# Patient Record
Sex: FEMALE | Race: WHITE | NOT HISPANIC OR LATINO | URBAN - METROPOLITAN AREA
[De-identification: names, ages, dates, MRNs, and addresses within clinical notes are randomized per-mention and may not be internally consistent; named-entity substitution may affect disease eponyms.]

---

## 2017-01-17 ENCOUNTER — OFFICE VISIT - RIVER FALLS (OUTPATIENT)
Dept: FAMILY MEDICINE | Facility: CLINIC | Age: 72
End: 2017-01-17

## 2017-10-27 ENCOUNTER — OFFICE VISIT - RIVER FALLS (OUTPATIENT)
Dept: FAMILY MEDICINE | Facility: CLINIC | Age: 72
End: 2017-10-27

## 2017-10-28 LAB — HBA1C MFR BLD: 5.1 %

## 2017-10-30 ENCOUNTER — OFFICE VISIT - RIVER FALLS (OUTPATIENT)
Dept: FAMILY MEDICINE | Facility: CLINIC | Age: 72
End: 2017-10-30

## 2017-10-30 ASSESSMENT — MIFFLIN-ST. JEOR: SCORE: 1117.77

## 2017-11-04 LAB
CREAT SERPL-MCNC: 0.75 MG/DL (ref 0.6–0.93)
GLUCOSE BLD-MCNC: 110 MG/DL (ref 65–99)

## 2017-11-06 ENCOUNTER — OFFICE VISIT - RIVER FALLS (OUTPATIENT)
Dept: FAMILY MEDICINE | Facility: CLINIC | Age: 72
End: 2017-11-06

## 2017-11-07 LAB
CREAT SERPL-MCNC: 0.89 MG/DL (ref 0.6–0.93)
GLUCOSE BLD-MCNC: 110 MG/DL (ref 65–99)

## 2018-04-04 ENCOUNTER — AMBULATORY - RIVER FALLS (OUTPATIENT)
Dept: FAMILY MEDICINE | Facility: CLINIC | Age: 73
End: 2018-04-04

## 2018-04-07 ENCOUNTER — AMBULATORY - RIVER FALLS (OUTPATIENT)
Dept: FAMILY MEDICINE | Facility: CLINIC | Age: 73
End: 2018-04-07

## 2018-04-07 ENCOUNTER — COMMUNICATION - RIVER FALLS (OUTPATIENT)
Dept: FAMILY MEDICINE | Facility: CLINIC | Age: 73
End: 2018-04-07

## 2018-05-30 ENCOUNTER — OFFICE VISIT - RIVER FALLS (OUTPATIENT)
Dept: FAMILY MEDICINE | Facility: CLINIC | Age: 73
End: 2018-05-30

## 2018-06-07 ENCOUNTER — OFFICE VISIT - RIVER FALLS (OUTPATIENT)
Dept: FAMILY MEDICINE | Facility: CLINIC | Age: 73
End: 2018-06-07

## 2018-06-21 ENCOUNTER — OFFICE VISIT - RIVER FALLS (OUTPATIENT)
Dept: FAMILY MEDICINE | Facility: CLINIC | Age: 73
End: 2018-06-21

## 2019-11-04 ENCOUNTER — AMBULATORY - RIVER FALLS (OUTPATIENT)
Dept: FAMILY MEDICINE | Facility: CLINIC | Age: 74
End: 2019-11-04

## 2019-11-04 ENCOUNTER — OFFICE VISIT - RIVER FALLS (OUTPATIENT)
Dept: FAMILY MEDICINE | Facility: CLINIC | Age: 74
End: 2019-11-04

## 2019-11-05 ENCOUNTER — COMMUNICATION - RIVER FALLS (OUTPATIENT)
Dept: FAMILY MEDICINE | Facility: CLINIC | Age: 74
End: 2019-11-05

## 2019-11-05 LAB — TSH SERPL DL<=0.005 MIU/L-ACNC: 0.19 MIU/L (ref 0.4–4.5)

## 2019-11-09 ENCOUNTER — COMMUNICATION - RIVER FALLS (OUTPATIENT)
Dept: FAMILY MEDICINE | Facility: CLINIC | Age: 74
End: 2019-11-09

## 2020-02-24 ENCOUNTER — OFFICE VISIT - RIVER FALLS (OUTPATIENT)
Dept: FAMILY MEDICINE | Facility: CLINIC | Age: 75
End: 2020-02-24

## 2020-02-24 ENCOUNTER — AMBULATORY - RIVER FALLS (OUTPATIENT)
Dept: FAMILY MEDICINE | Facility: CLINIC | Age: 75
End: 2020-02-24

## 2020-02-24 ENCOUNTER — COMMUNICATION - RIVER FALLS (OUTPATIENT)
Dept: FAMILY MEDICINE | Facility: CLINIC | Age: 75
End: 2020-02-24

## 2020-03-05 ENCOUNTER — AMBULATORY - RIVER FALLS (OUTPATIENT)
Dept: FAMILY MEDICINE | Facility: CLINIC | Age: 75
End: 2020-03-05

## 2020-03-06 ENCOUNTER — OFFICE VISIT - RIVER FALLS (OUTPATIENT)
Dept: FAMILY MEDICINE | Facility: CLINIC | Age: 75
End: 2020-03-06

## 2020-03-06 ENCOUNTER — COMMUNICATION - RIVER FALLS (OUTPATIENT)
Dept: FAMILY MEDICINE | Facility: CLINIC | Age: 75
End: 2020-03-06

## 2021-05-12 ENCOUNTER — COMMUNICATION - RIVER FALLS (OUTPATIENT)
Dept: FAMILY MEDICINE | Facility: CLINIC | Age: 76
End: 2021-05-12

## 2022-02-11 VITALS — WEIGHT: 134 LBS | HEART RATE: 84 BPM | SYSTOLIC BLOOD PRESSURE: 108 MMHG | DIASTOLIC BLOOD PRESSURE: 72 MMHG

## 2022-02-12 VITALS
OXYGEN SATURATION: 96 % | DIASTOLIC BLOOD PRESSURE: 66 MMHG | SYSTOLIC BLOOD PRESSURE: 114 MMHG | WEIGHT: 128.4 LBS | BODY MASS INDEX: 21.26 KG/M2 | TEMPERATURE: 97.2 F | HEART RATE: 100 BPM

## 2022-02-12 VITALS
WEIGHT: 138 LBS | OXYGEN SATURATION: 97 % | DIASTOLIC BLOOD PRESSURE: 80 MMHG | RESPIRATION RATE: 16 BRPM | WEIGHT: 136 LBS | HEART RATE: 88 BPM | TEMPERATURE: 98.2 F | BODY MASS INDEX: 22.66 KG/M2 | BODY MASS INDEX: 21.03 KG/M2 | BODY MASS INDEX: 22.96 KG/M2 | HEART RATE: 84 BPM | DIASTOLIC BLOOD PRESSURE: 84 MMHG | WEIGHT: 127 LBS | TEMPERATURE: 97 F | TEMPERATURE: 96.3 F | SYSTOLIC BLOOD PRESSURE: 124 MMHG | HEART RATE: 64 BPM | SYSTOLIC BLOOD PRESSURE: 110 MMHG | DIASTOLIC BLOOD PRESSURE: 80 MMHG | SYSTOLIC BLOOD PRESSURE: 118 MMHG | HEIGHT: 65 IN

## 2022-02-12 VITALS
BODY MASS INDEX: 22.19 KG/M2 | TEMPERATURE: 96.8 F | SYSTOLIC BLOOD PRESSURE: 110 MMHG | WEIGHT: 134 LBS | DIASTOLIC BLOOD PRESSURE: 74 MMHG | HEART RATE: 80 BPM

## 2022-02-15 NOTE — TELEPHONE ENCOUNTER
Entered by Ana Hillman CMA on May 12, 2021 6:01:31 AM CDT  ---------------------  From: Ana Hillman CMA   To: [facility]    Sent: 5/12/2021 6:01:31 AM CDT  Subject: Medication Management     ** Submitted: **  Order:levothyroxine (levothyroxine 100 mcg (0.1 mg) oral tablet)  1 tab(s)  Oral  daily  Qty:  30 tab(s)        Refills:  0          Substitutions Allowed     Route To Pharmacy [facility]    Signed by Ana Hillman CMA  5/12/2021 11:01:00 AM [facility]    ** Submitted: **  Complete:levothyroxine (levothyroxine 100 mcg (0.1 mg) oral tablet)   Signed by Ana Hillman CMA  5/12/2021 11:01:00 AM [facility]    ** Not Approved:  **  levothyroxine (LEVOTHYROXINE SODIUM 100MCG TABS)  TAKE ONE TABLET BY MOUTH EVERY MORNING  Qty:  90 unknown unit        Days Supply:  90        Refills:  1          Substitutions Allowed     Route To [facility]   Signed by Ana Hillman CMA            ** Patient matched by Ana Hillman CMA on 5/12/2021 5:56:59 AM CDT **      ------------------------------------------  From: [facility]  To: Esthela Camara  Sent: May 11, 2021 12:45:57 PM CDT  Subject: Medication Management  Due: April 27, 2021 12:41:43 AM CDT     ** On Hold Pending Signature **     Drug: levothyroxine (levothyroxine 100 mcg (0.1 mg) oral tablet), TAKE ONE TABLET BY MOUTH EVERY MORNING  Quantity: 90 unknown unit  Days Supply: 90  Refills: 0  Substitutions Allowed  Notes from Pharmacy:     Dispensed Drug: levothyroxine (levothyroxine 100 mcg (0.1 mg) oral tablet), TAKE ONE TABLET BY MOUTH EVERY MORNING  Quantity: 90 unknown unit  Days Supply: 90  Refills: 1  Substitutions Allowed  Notes from Pharmacy:  ------------------------------------------3/6/20 thyroid  rtc (placed today) due now

## 2022-02-15 NOTE — CARE COORDINATION
Sources of Information:  [ ] Patient, family member, or caregiver (Please list):  [x] Hospital discharge summary  [ ] Hospital fax  [ ] List of recent hospitalizations or ED visits  [ ] Other:     Discharged From: [facility]  Discharge Date: 10/28/17    Diagnosis/Problem: alcoholic ketoacidosis, alcohol use, acquired hypothyroidsm    Medication Changes: [ ] Yes [x ] No   Medication List Updated: [x] Yes [] No    Needs Referral or Lab: [x ] Yes [ ] No       Needs Follow-up Appointment:  [x ] Within 7 days of discharge (highly complex visit)  [ ] Within 14 days of discharge (moderately complex visit)    Appointment Made With: NORBERTO  Date: 10/30/17    Pt presented for appt before I was able to call pt. Labs ordered.

## 2022-02-15 NOTE — TELEPHONE ENCOUNTER
---------------------  From: Roman Delgado   Sent: 9/16/2019 3:16:40 PM CDT  Subject: General Message     Med Refill      Date of last office visit and reason:  6/7/2018; Anxiety/Hypothyroidism/alcohol abuse                                  Date of last Med Check / Px:   06/7/2018  Date of last labs pertaining to med:  04/7/2018    RTC order in chart:  Yes, DUE NOW!    For Protocol refill, has patient been contacted:  yes      Received fax from [employer]. Sent 30 day supply and added a note stating that she needs an appt for further refills.

## 2022-02-15 NOTE — PROGRESS NOTES
called pt, although no symptoms I am going to treat for 3 d with cipro givne her recent hospitalization and poor nutrition

## 2022-02-15 NOTE — LETTER
(Inserted Image. Unable to display)   November 05, 2019      ERIS CUEVAS      [address], [place] [id number]        Dear ERIS,    Thank you for selecting [facility] for your healthcare needs.  Below you will find the results of the recent tests done at our clinic.      Your dose needs to be adjusted, Eris. I look forward to seeing you for an upcoming appointment.      Result Name Current Result Previous Result Reference Range   TSH (mIU/L) ((L)) 0.19 11/4/2019  0.65 4/7/2018 0.40 - 4.50       Please contact me or my assistant at [phone] if you have any questions or concerns.     Sincerely,        YI Reinoso-NP  Family Nurse Practitioner      What do your labs mean?  Below is a glossary of commonly ordered labs:  LDL   Bad Cholesterol   HDL   Good Cholesterol  AST/ALT   Liver Function   Cr/Creatinine   Kidney Function  Microalbumin   Kidney Function  BUN   Kidney Function  PSA   Prostate    TSH   Thyroid Hormone  HgbA1c   Diabetes Test   Hgb (Hemoglobin)   Red Blood Cells  WBC   White Blood Cell Count

## 2022-02-15 NOTE — PROGRESS NOTES
"   Patient:   ERIS CUEVAS            MRN: [id number]            FIN: [id number]               Age:   73 years     Sex:  Female     :  1945   Associated Diagnoses:   Alcohol abuse; Anxiety; Hypothyroid   Author:   Alicia Ring      Chief Complaint   2018 8:26 AM CDT     Thyroid f/u.      History of Present Illness   PPC originally for an wellness exam but unfortunately she arrived almost 30 minutes late and in the the exam room she tells me she really only wants her thyroid medication refilled.  mammogram: last one   thyroid check 2018: normal  alcohol abuse: on going concern, pt very teary eyed, \"raise my  from the dead\"  and maybe I would be better.  She has tried detox in past but refuses assistance at this time  she considers herself a \"social drinker\", she admits it gives her comfort and admits she drinks d/t resentment from aging and anxiety related to these changes.  Please see 2018 note from Dr Westbrook when Eris was seen after falling  she has tried counseling in past (\"in my thirties\") and tells me she is not \"a fan\"      Review of Systems   Constitutional:  Negative.    Ear/Nose/Mouth/Throat:  Negative.    Respiratory:  Negative.    Cardiovascular:  Negative.    Gastrointestinal:  Negative, fy hx of colon cancer but pt refuses to repeat colonoscopy.    Hematology/Lymphatics:  Negative.    Endocrine:  Negative except as documented in history of present illness.    Immunologic:  Negative.    Musculoskeletal:  Negative.    Integumentary:  Negative.    Neurologic:  Negative except as documented in history of present illness.    Psychiatric:  Negative except as documented in history of present illness, No zac, Not suicidal, Not delusional, No hallucinations.       Health Status   Allergies:    Allergic Reactions (Selected)  No known allergies   Medications:  (Selected)   Prescriptions  Prescribed  betamethasone dipropionate 0.05% topical cream: 1 sha, top, bid, " Instructions: to affected area, # 50 gm, 1 Refill(s), Type: Maintenance, Pharmacy: [facility], to replace fluocinonide cream due to cost, 1 sha top bid,x7 day(s),Instr:to affected area  levothyroxine 112 mcg (0.112 mg) oral tablet: 1 tab(s) ( 112 mcg ), po, daily, # 90 tab(s), 3 Refill(s), Type: Maintenance, Pharmacy: [facility], 1 tab(s) po daily  Documented Medications  Documented  aspirin 81 mg oral tablet: 1 tab(s) ( 81 mg ), po, daily, 0 Refill(s), Type: Maintenance   Problem list:    All Problems (Selected)  Disorder of bone and cartilage / SNOMED CT 607534218 / Confirmed  Dyslipidemia / SNOMED CT 2183637782 / Confirmed  Fatty liver, alcoholic / SNOMED CT 05628257 / Confirmed  Hypothyroid / SNOMED CT 87181443 / Confirmed  Prediabetes / SNOMED CT 9768672384 / Confirmed      Histories   Family History:    Cancer  Grandfather (M)  Diabetes mellitus type 2  Aunt (M)  Aunt (P)  CA - Cancer of colon  Sister  Colon cancer.  Father  Brother  Degeneration macular.  Mother        Physical Examination   Vital Signs   6/7/2018 8:26 AM CDT Peripheral Pulse Rate 84 bpm    Pulse Site Radial artery    HR Method Manual    Systolic Blood Pressure 108 mmHg    Diastolic Blood Pressure 72 mmHg    Mean Arterial Pressure 84 mmHg    BP Site Right arm    BP Method Manual      Measurements from flowsheet : Measurements   6/7/2018 8:26 AM CDT     Weight Measured - Standard                134 lb     General:  Alert and oriented, No acute distress, crying in room.    Eye:  Pupils are equal, round and reactive to light.    HENT:  Normocephalic.    Neck:  Supple, Non-tender, No thyromegaly.    Respiratory:  Lungs are clear to auscultation, Respirations are non-labored.    Cardiovascular:  Normal rate, Regular rhythm, No edema.    Musculoskeletal:  Normal range of motion, Normal gait.    Integumentary:  Warm, Dry, Pink.    Neurologic:  Alert, Oriented, Normal sensory, No focal deficits.    Psychiatric:  Cooperative, Appropriate mood &  "affect, Normal judgment.       Impression and Plan   Diagnosis     Alcohol abuse (NYL09-EW F10.10).     Anxiety (MWF84-YO F41.9).     Hypothyroid (XOM91-ET E03.9).     Summary:  \"i am so fucking independent I don't want to rely on anything like that.\" (counseling)  \"Im resentful because of aging issues\"  \" i try to numb myself to the loss\"  I spent over 25 minutes with pt today with over 50% in education and counseling, she has agreed to talk to a counselor about her anxiety and depression, I have discussed detox but she refuses to entertain this thought, This is an open door offer when and if she is willing to consider this    her TSH was normal 4/2018 so I have refilled her medication    mammogram will be ordered as she is willing to do this    refuses colonoscopy but will do cologuard.    would like to see her back in 1 month   .    Orders     Orders (Selected)   Prescriptions  Prescribed  levothyroxine 112 mcg (0.112 mg) oral tablet: 1 tab(s) ( 112 mcg ), po, daily, # 90 tab(s), 3 Refill(s), Type: Maintenance, Pharmacy: [facility], 1 tab(s) po daily.  "

## 2022-02-15 NOTE — LETTER
(Inserted Image. Unable to display)   May 14, 2021    ANNALISE CUEVAS  [address] [place] [address]            Dear ANNALISE,      Thank you for selecting [facility] for your healthcare needs.    Our records indicate you are due for the following services:    Follow-up office visit / Medication Check.    Non-Fasting Labs.    If you had your labs done at another facility or with Direct Access Lab Testing at [facility], please bring in a copy of the results to your next visit, mail a copy, or drop off a copy of your results to your Healthcare Provider.    (FYI   Regarding office visits: In some instances, a video visit or telephone visit may be offered as an option.)    You are due for lab work and an office visit; please schedule the lab appointment 1 week before the office visit.  This will assure all results are available to discuss with your Healthcare Provider during your visit.    **It is very helpful if you bring your medication bottles to your appointment.  This assures we have all of your current medications, including strength and dosing information, documented accurately in your medical record.    To schedule an appointment or if you have further questions, please contact your clinic at [phone].      Powered by [employer]    Sincerely,    REYNOLD Kevin

## 2022-02-15 NOTE — TELEPHONE ENCOUNTER
---------------------  From: Roman Delgado   To: [employer] ([id number]_[facility]);     Sent: 3/10/2020 1:59:56 PM CDT  Subject: Colonoscopy order      Phone Message    PCP:   JOHN                             Time of Call:  147pm                                        Person Calling:  Jose Martin Hart  Phone number:  [phone]      Note:   Hailey called as pt is now interesting in proceeding with a colonoscopy. Would like this order placed. If Hailey can be called to set this up as she states that pt won't remember. Please advise.     Last office visit and reason:  03/06/2020; Thyroid---------------------  From: Annelise Patel LPN ([employer] ([id number]))   To: Esthela Camara;     Sent: 3/11/2020 8:07:11 AM CDT  Subject: FW: Colonoscopy orderThere is a release in chart to talk with Hailey as well  I signed order for colonoscopy---------------------  From: Annelise Patel LPN ([employer] ([id number]))   To: Esthela Camara;     Sent: 3/11/2020 10:34:51 AM CDT  Subject: FW: Colonoscopy order      1009 Called and spoke with Hailey. Pt is wanting to have colonoscopy done at [facility]. Informed her that the order has been completed and that someone would be calling to help get this scheduled.     Hailey has some other questions. She says at a previous appt there was discussion about seeing neurology for memory loss. She would like to get a referral and move forward with this. Also, wanting a referral for PT to help patient with balance issues.---------------------  From: Esthela Camara   To: [employer] ([id number]_[facility]);     Sent: 3/11/2020 11:02:39 AM CDT  Subject: RE: Colonoscopy order      Those are also things I offered Eris during our visit and that I think would be beneficial.   Please put referral through for neurology for memory loss and for TP for balance issues, [id number] LM on personalized VM for Hailey letting her know  that both referrals were placed. The referral dept will reach out on the neurology referral. For PT, not sure where she wants to go. I will leave the order along with the list of places for therapy at the  unless I am told to do something else.Hailey GABRIEL at 1:26pm. Returned call and she would like PT order to go over to [facility]. Order brought over.

## 2022-02-15 NOTE — LETTER
(Inserted Image. Unable to display)     September 23, 2019      ANNALISE CUEVAS  [address] [place] [place], [place] [id number]          Dear ANNALISE,      Thank you for selecting [facility] for your healthcare needs.      Our records indicate you are due for the following services:     Non-Fasting Labs.    If you had your labs done at another facility or with Direct Access Lab Testing at [facility], please bring in a copy of the results to your next visit, mail a copy, or drop off a copy of your results to your Healthcare Provider.      To schedule an appointment or if you have further questions, please contact your primary clinic:   [facility]       [phone]   [facility]       [phone]              [facility]     [phone]      Powered by [employer]    Sincerely,    REYNOLD Kevin

## 2022-02-15 NOTE — LETTER
(Inserted Image. Unable to display)   March 06, 2020      ERIS CUEVAS      [address], [place] [id number]        Dear ERIS,    Thank you for selecting [facility] for your healthcare needs.  Below you will find the results of the recent tests done at our clinic.      Here are the lab results we discussed today, thanks, Eris.      Result Name Current Result Previous Result Reference Range   Sodium Level (mmol/L)  144 3/5/2020  135 - 146   Potassium Level (mmol/L)  3.9 3/5/2020  3.5 - 5.3   Chloride Level (mmol/L)  105 3/5/2020  98 - 110   CO2 Level (mmol/L)  25 3/5/2020  20 - 32   Glucose Level (mg/dL) ((H)) 145 3/5/2020  65 - 99   BUN (mg/dL)  16 3/5/2020  7 - 25   Creatinine Level (mg/dL)  0.69 3/5/2020  0.60 - 0.93   BUN/Creat Ratio  NOT APPLICABLE 3/5/2020  6 - 22   eGFR (mL/min/1.73m2)  87 3/5/2020  > OR = 60 -    eGFR  (mL/min/1.73m2)  101 3/5/2020  > OR = 60 -    Calcium Level (mg/dL)  9.0 3/5/2020  8.6 - 10.4   Bilirubin Total (mg/dL)  0.7 3/5/2020 ((H)) 1.4 4/7/2018 0.2 - 1.2   Alkaline Phosphatase (unit/L)  119 3/5/2020  116 4/7/2018 37 - 153   AST/SGOT (unit/L)  26 3/5/2020  19 4/7/2018 10 - 35   ALT/SGPT (unit/L)  13 3/5/2020  15 4/7/2018 6 - 29   Protein Total (gm/dL)  6.6 3/5/2020  6.7 4/7/2018 6.1 - 8.1   Albumin Level (gm/dL)  4.5 3/5/2020  4.4 4/7/2018 3.6 - 5.1   Globulin  2.1 3/5/2020  2.3 4/7/2018 1.9 - 3.7   A/G Ratio  2.1 3/5/2020  1.9 4/7/2018 1.0 - 2.5   LDL Direct (mg/dL) ((H)) 161 3/5/2020   - <100   TSH (mIU/L)  0.57 3/5/2020 ((L)) 0.19 11/4/2019 0.40 - 4.50       Please contact me or my assistant at [phone] if you have any questions or concerns.     Sincerely,        YI Reinoso-NP  Family Nurse Practitioner      What do your labs mean?  Below is a glossary of commonly ordered labs:  LDL   Bad Cholesterol   HDL   Good Cholesterol  AST/ALT   Liver Function   Cr/Creatinine   Kidney Function  Microalbumin   Kidney Function   BUN   Kidney Function  PSA   Prostate    TSH   Thyroid Hormone  HgbA1c   Diabetes Test   Hgb (Hemoglobin)   Red Blood Cells  WBC   White Blood Cell Count

## 2022-02-15 NOTE — PROGRESS NOTES
Patient:   ANNALISE CUEVAS            MRN: [id number]            FIN: [id number]               Age:   72 years     Sex:  Female     :  1945   Associated Diagnoses:   None   Author:   Alicia Ring      Visit Information      Primary Care Provider (PCP):  Esthela Camara NPI# [id number]      Chief Complaint   2017 11:50 AM CST   f/u ketacidosis. Labs due. Still low appetite and low level nausea.      History of Present Illness   The patient is in to see me for followup from her alcoholic ketoacidosis hospital admission.  I saw her in clinic 2017 when she came in with complaints of nausea and loss of appetite for over two weeks.  She had stated that she had been on a diet but had really lost significant weight over the past month.  She states that she drinks alcohol on a nightly basis; three to four glasses of wine and occasionally mixed drinks if she is socializing.  On the 2017 visit she had been at a [employer] party about two weeks prior to this visit and since then her symptoms had dramatically increased.  She also has a history of hypothyroidism.  Based on the lab results and negative CT findings at that visit we admitted her for alcoholic ketoacidosis and she was hydrated overnight.  She followed up on 2017 with Dr. Wing Segundo.  At that point she was feeling dramatically better.  She still had low appetite but was able to eat and drink better.  She denied any ongoing alcohol intake.  Labs were drawn again which showed improvement in her AST and ALT and electrolytes had started to stabilize.  On 2017 I called her to let her know that her urine culture did return positive for E. coli so I started her on a three day Cipro routine.  Today when she presents to the clinic she states that she still does not have much of an appetite; although, she is forcing herself to eat.  She does not complain of vomiting but just states that she doesn t have  much of an appetite as a whole.  Since October 27, 2017, when she weighed 127 pounds, she has gained 11 pounds.  Blood pressure is stable.  She is denying any chest pain, diarrhea, or dysuria.  She denies any withdrawal symptoms.  She states that she sleeps fairly well.  She is slightly tremulous in the room as we are discussing her health and very fidgety.  Concerning is that she has asked me repetitively what day it is today.  I have asked her if she feels like she is anxious or depressed and she denies both of these things.           Review of Systems   Constitutional:  Negative.    Ear/Nose/Mouth/Throat:  Negative.    Respiratory:  Negative.    Cardiovascular:  Negative.    Gastrointestinal:  Nausea, No vomiting, No diarrhea.    Genitourinary:  Negative.    Gynecologic:  Negative.    Endocrine:  Negative.    Musculoskeletal:  Negative.    Integumentary:  Negative.    Neurologic:  Negative except as documented in history of present illness.    Psychiatric:  Negative except as documented in history of present illness.       Health Status   Allergies:    Allergic Reactions (Selected)  No known allergies   Medications:  (Selected)   Prescriptions  Prescribed  Cipro 500 mg oral tablet: 1 tab(s) ( 500 mg ), PO, q12hr, # 6 tab(s), 0 Refill(s), Type: Maintenance, Pharmacy: [facility], 1 tab(s) po q12 hrs,x3 day(s)  betamethasone dipropionate 0.05% topical cream: 1 sha, top, bid, Instructions: to affected area, # 50 gm, 1 Refill(s), Type: Maintenance, Pharmacy: [facility], to replace fluocinonide cream due to cost, 1 sha top bid,x7 day(s),Instr:to affected area  levothyroxine 112 mcg (0.112 mg) oral tablet: 1 tab(s) ( 112 mcg ), po, daily, # 90 tab(s), 3 Refill(s), Type: Maintenance, Pharmacy: [facility], 1 tab(s) po daily  potassium chloride 10 mEq oral tablet, extended release: See Instructions, Instructions: TAKE TWO TABLETS BY MOUTH TWICE DAILY WITH MEALS FOR FIVE DAYS, # 20 unknown unit, Type: Soft Stop,  Pharmacy: [facility], TAKE TWO TABLETS BY MOUTH TWICE DAILY WITH MEALS FOR FIVE DAYS  Documented Medications  Documented  aspirin 81 mg oral tablet: 1 tab(s) ( 81 mg ), po, daily, 0 Refill(s), Type: Maintenance  omeprazole 20 mg oral delayed release capsule: 1 cap(s) ( 20 mg ), PO, Daily, # 90 cap(s), 0 Refill(s), Type: Maintenance  ondansetron 4 mg oral tablet, disintegratin tab(s) ( 4 mg ), PO, q8 hrs, # 10 tab(s), 0 Refill(s), Type: Maintenance   Problem list:    All Problems (Selected)  Disorder of bone and cartilage / SNOMED CT 773631982 / Confirmed  Dyslipidemia / SNOMED CT 4026700561 / Confirmed  Fatty liver, alcoholic / SNOMED CT 47582712 / Confirmed  Hypothyroid / SNOMED CT 23729610 / Confirmed  Prediabetes / SNOMED CT 9771425833 / Confirmed      Histories   Past Medical History:    Active  Hypothyroid (28627877)  Dyslipidemia (4338035699)  Comments:  2013 CST 2:36 PM Brenda Alas  Mild  Disorder of bone and cartilage (575221163)  Comments:  2013 CST 2:38 PM Brenda Alas  Mild    10/2/2015 CDT 2:06 PM CDT - Esthela Camara  declines dexa  Prediabetes (9909456341)  Fatty liver, alcoholic (58340062)  Resolved  Inpatient stay (460401991): Onset on 10/27/2017 at 72 years.  Resolved on 10/28/2017 at 72 years.  Comments:  10/31/2017 CDT 6:00 AM CDT - Desirae Andrea  @[facility], [place] - Ketoacidosis (likely starvation and alcoholic); alcohol associated hepatitis  Diverticulosis (0844055869): Onset in the month of 2003 at 58 years  Resolved.  History of fracture of left ankle (4883401928):  Resolved.  Comments:  2013 CST 9:20 AM Brenda Alas  Left ankle. Repaired 2005.  Carpal tunnel syndrome on left (16544923):  Resolved.  Comments:  2014 CDT 11:10 AM CDT - Brown RN, Elizabeth  d/t broken wrist  History of radius fracture (3684304086):  Resolved.  Comments:  2013 MATTHEW 9:33 AM Brenda Alas  Left distal.   Family History:     Cancer  Grandfather (M)  Diabetes mellitus type 2  Aunt (M)  Aunt (P)  CA - Cancer of colon  Sister  Colon cancer.  Father  Brother  Degeneration macular.  Mother     Social History:        Alcohol Assessment            Current, Wine (5 oz), 3-5 times per week, 1 drinks/episode average.  2 drinks/episode maximum.      Tobacco Assessment            Never      Substance Abuse Assessment            Never      Employment and Education Assessment            Self-employed, Work/School description: [employer]  Highest education level: Post graduate               degree(s).      Home and Environment Assessment            Marital status: .  0 children.      Nutrition and Health Assessment            Type of diet: Regular, healthy, almost vegetarian but eats fish.      Exercise and Physical Activity Assessment            Exercise frequency: Daily.  Exercise type: Bicycling, Walking, Weight lifting, Cardiovascular, yard work.      Sexual Assessment            Sexually active: No.        Physical Examination   Vital Signs   11/6/2017 11:50 AM CST Temperature Tympanic 98.2 DegF    Peripheral Pulse Rate 84 bpm    Pulse Site Radial artery    HR Method Manual    Systolic Blood Pressure 124 mmHg    Diastolic Blood Pressure 80 mmHg    Mean Arterial Pressure 95 mmHg    BP Site Right arm    BP Method Manual      General:  Alert and oriented, No acute distress.    Eye:  Pupils are equal, round and reactive to light.    HENT:  Normocephalic.    Respiratory:  Respirations are non-labored.    Cardiovascular:  Regular rhythm, No edema.    Musculoskeletal:  Normal range of motion, Normal gait.    Integumentary:  Warm, Dry, Pink.    Neurologic:  Alert, Oriented, Normal sensory, No focal deficits.    Psychiatric:  Cooperative, Appropriate mood & affect, Normal judgment.       Review / Management   Results review:  pending.       Impression and Plan   Patient Instructions:          Limitations: Alcohol consumption.          Counseled: Patient, Verbalized understanding.    Summary:  At this point I would like to bring her back in four weeks and reassess how she is doing.  She tells me she remains sober and I have encouraged that behavior.  If her labs have worsened or not seeming to improve on a continuous basis, we may need to intervene sooner to reassess for causes of lack of appetite; although, I suspect that this is still from recovery period from her alcoholic ketoacidosis.  When she returns in four weeks, if she continues to ask repetitive questions, she would need a Mini-Mental status examination..

## 2022-02-15 NOTE — LETTER
(Inserted Image. Unable to display)                                                                                                [address] [address]                                                March 13, 2020Re: ANNALISE RODRIGUEZLEA1945 [facility] [address]  [address], [place] [address]Dy:  [facility] ClinicThe following patient has been referred to your office/practice:  ANNALISE CUEVAS Appointment:  Appointment is PendingPlease refer to the attached  clinical documentation for a summary of ANNALISE's care.  Please do not hesitate to contact our office if any additional clinical questions arise.  All relevant records and transition of care documents should be mailed or faxed.Your assistance in providing continuity of care is appreciated. Sincerely, [facility] [place] [place] [address](P) [phone](F) [phone]

## 2022-02-15 NOTE — TELEPHONE ENCOUNTER
---------------------  From: Cassie Grace CMA   Sent: 3/10/2020 6:05:58 PM CDT  Subject: levothyroxine q     Pt LM at 1756 in regards to levo refills. Pt notified that 1 year refills sent on 3/6 - she states didn't think [facility] got that order. I verified with [facility] and they will notify pt once ready to .

## 2022-02-15 NOTE — TELEPHONE ENCOUNTER
---------------------  From: Ana Hillman CMA (eRx Pool ([id number]_[facility]))   To: [place] Message Pool ([id number]_[facility]);     Sent: 11/9/2019 1:20:04 PM CST  Subject: FW: Medication Management   Due Date/Time: 11/10/2019 11:56:00 AM CST       Notes from Pharmacy: ANNALISE SAID IT COSTS HER $19 TO TAKE TAXI TO CLINIC, COULD YOU POSSIBLY TALK TO HER OVER THE PHONE, AND THEN ORDER THE NEW THYROID DOSE .  THANKS    REJI      ** Patient matched by nAa Hillman CMA on 11/9/2019 1:19:12 PM CST **      ------------------------------------------  From: Felipe Walker  To: Esthela Camara  Sent: November 9, 2019 11:56:14 AM CST  Subject: Medication Management  Due: November 10, 2019 11:56:14 AM CST    ** On Hold Pending Signature **  Drug: levothyroxine (levothyroxine 112 mcg (0.112 mg) oral tablet)  TAKE 1 TABLET BY MOUTH ONCE DAILY - [facility] 9/14-DENIED 11/6/19  Quantity: 30 unknown unit  Days Supply: 0  Refills: 0  Substitutions Allowed  Notes from Pharmacy: ANNALISE SAID IT COSTS HER $19 TO TAKE TAXI TO CLINIC, COULD YOU POSSIBLY TALK TO HER OVER THE PHONE, AND THEN ORDER THE NEW THYROID DOSE .  THANKS    REJI    Dispensed Drug: levothyroxine (levothyroxine 112 mcg (0.112 mg) oral tablet)  TAKE 1 TABLET BY MOUTH ONCE DAILY - [facility] 9/14-DENIED 11/6/19  Quantity: 30 unknown unit  Days Supply: 0  Refills: 0  Substitutions Allowed  Notes from Pharmacy: ANNALISE SAID IT COSTS HER $19 TO TAKE TAXI TO CLINIC, COULD YOU POSSIBLY TALK TO HER OVER THE PHONE, AND THEN ORDER THE NEW THYROID DOSE .  THANKS    REJI  ---------------------------------------------------------------  From: Annelise Patel LPN ([employer] Message Pool ([id number]_[facility]))   To: Esthela Camara;     Sent: 11/10/2019 1:47:06 PM CST  Subject: FW: Medication Management   Due Date/Time: 11/10/2019 11:56:00 AM CSTI spoke with pt, informed her to reduce dose levothyroxine and see m in about 8 weeks

## 2022-02-15 NOTE — TELEPHONE ENCOUNTER
Entered by Annelise Patel LPN on June 01, 2020 9:19:35 AM CDT  Last office visit 3/6/20 - thyroid. Note does state that the patient is not interested in colonoscopy but it was stilled ordered.      ---------------------  From: Christina Mcdowell   To: [employer] Message Pool ([id number]_[facility]);     Sent: 5/29/2020 1:31:08 PM CDT  Subject: General Message     Per note re; colonoscopy order from [facility] patient stated she does not want colonoscopy and may need further guidance.---------------------  From: Annelise Patel LPN ([employer] Message Pool ([id number]))   To: Esthela Camara;     Sent: 6/1/2020 9:19:41 AM CDT  Subject: FW: General Message---------------------  From: Esthela Camara   To: [employer] Message Pool ([id number]_[facility]);     Sent: 6/1/2020 10:46:32 AM CDT  Subject: RE: General Message     please cancel order---------------------  From: Annelise Patel LPN ([employer] Message Pool ([id number]_[facility]))   To: Christina Mcdowell;     Sent: 6/1/2020 11:02:42 AM CDT  Subject: FW: General Message

## 2022-02-15 NOTE — PROGRESS NOTES
Patient:   ERIS CUEVAS            MRN: [id number]            FIN: [id number]               Age:   74 years     Sex:  Female     :  1945   Associated Diagnoses:   Dyslipidemia; Hypothyroid   Author:   Esthela Camara      Visit Information      Date of Service: 2020 10:20 am  Performing Location: [facility]  Encounter#: [id number]      Chief Complaint   3/6/2020 10:33 AM CST    medication check/lab work        History of Present Illness   here with friend, Yumiko, who also transported Eris and she doesn't drive  Using levothyroxine, level checked in Nov, showed over medicated, dose reduced and rechecked yesterdday and now normal. She was having some jitteriness in November and feels much better now.  She lives alone in , she walks to get her groceries, she cares for herself, lives along in house, spouse  over 15 years ago. She has brothers and sisters (no children) in [place], [place]. Her father recently  age 99, her mother is still living.  Eris enjoys her privacy. Her friend is worried about how much Eris drinks, Eris says she has maybe 2 glasses wine per day, sometime vodka. She is not a smoker, she fell once last year and is not interested in any balance training /assessment.  She is not interested in Colonoscopy, she is will consider a mammo if it can be done today  Her friend reminds her that she has been worried about her memory  She had forgotten she was in clinic for lab draw yesterday. She assures me she has no trouble remembering her thyroid medication and assures me she is a healthy cook and not loosing wt      Health Status   Allergies:    Allergic Reactions (Selected)  No Known Medication Allergies   Medications:  (Selected)   Prescriptions  Prescribed  levothyroxine 100 mcg (0.1 mg) oral tablet: = 1 tab(s) ( 100 mcg ), Oral, daily, # 90 tab(s), 3 Refill(s), Type: Maintenance, Pharmacy: [facility], disp  for 30, 1 tab(s) Oral  daily  Documented Medications  Documented  aspirin 81 mg oral tablet: 1 tab(s) ( 81 mg ), po, daily, 0 Refill(s), Type: Maintenance   Problem list:    All Problems  Disorder of bone and cartilage / SNOMED CT 625909201 / Confirmed  Mild  declines dexa  Dyslipidemia / SNOMED CT 7164210773 / Confirmed  Mild  Fatty liver, alcoholic / SNOMED CT 21076859 / Confirmed  Hypothyroid / SNOMED CT 34638237 / Confirmed  Prediabetes / SNOMED CT 1218484656 / Confirmed  Resolved: Carpal tunnel syndrome on left / SNOMED CT 87117182  d/t broken wrist  Resolved: Diverticulosis / SNOMED CT 5075772781  Resolved: History of fracture of left ankle / SNOMED CT 3447683038  Left ankle. Repaired 12/2005.  Resolved: History of radius fracture / SNOMED CT 3071425282  Left distal.  Resolved: Inpatient stay / SNOMED CT 158986307  @[facility], [place] - Ketoacidosis (likely starvation and alcoholic); alcohol associated hepatitis  Canceled: Grave's Disease / ICD-9-.00  Canceled: Personal History of Breast Cancer / ICD-9-CM V10.3      Histories   Past Medical History:    Active  Hypothyroid (41454540)  Dyslipidemia (8745793382)  Comments:  2/21/2013 CST 2:36 PM Brenda Alas  Mild  Disorder of bone and cartilage (256953122)  Comments:  2/21/2013 CST 2:38 PM Brenda Alas  Mild    10/2/2015 CDT 2:06 PM CDT - Esthela Camara  declines dexa  Prediabetes (8867602653)  Fatty liver, alcoholic (03519542)  Resolved  Inpatient stay (532456500): Onset on 10/27/2017 at 72 years.  Resolved on 10/28/2017 at 72 years.  Comments:  10/31/2017 CDT 6:00 AM CDT - Desirae Andrea  @[facility], [place] - Ketoacidosis (likely starvation and alcoholic); alcohol associated hepatitis  Diverticulosis (0981148187): Onset in the month of 12/2003 at 58 years  Resolved.  History of fracture of left ankle (9849332931):  Resolved.  Comments:  2/22/2013 CST 9:20 AM Brenda Alas  Left ankle. Repaired 12/2005.  Carpal tunnel syndrome on  "left (67784637):  Resolved.  Comments:  5/21/2014 CDT 11:10 AM CDT - Munir MALIK, Elizabeth  d/t broken wrist  History of radius fracture (5264573589):  Resolved.  Comments:  2/22/2013 CST 9:33 AM Brenda Alas  Left distal.   Family History:    Cancer  Grandfather (M)  Diabetes mellitus type 2  Aunt (M)  Aunt (P)  CA - Cancer of colon  Sister  Colon cancer.  Father  Brother  Degeneration macular.  Mother     Procedure history:    HPV - Human papillomavirus test negative (SNOMED CT 7815616496) on 2/25/2013 at 67 Years.  Comments:  3/5/2013 2:38 PM Jocelyn Toscano  Low risk for cervical cancer and may stop pap screening. (Age 67)  Colonoscopy (SNOMED CT 217106518) in the month of 12/2008 at 63 Years.  Comments:  3/5/2013 2:36 PM Jocelyn Toscano  Due to family hx needs repeat in 5 yrs. (Due 2013)    2/22/2013 9:13 AM Brenda Alas  [facility]  Excision of lipoma (SNOMED CT 742664819) on 3/30/2006 at 61 Years.  Comments:  2/22/2013 8:42 AM Brenda Alas  Back, right side.  Dr. Prado, [facility].  Hysteroscopy (SNOMED CT 973787519) on 12/29/2005 at 60 Years.  ORIF - Open reduction and internal fixation of fracture (SNOMED CT 505281193) in the month of 12/2005 at 60 Years.  Comments:  2/22/2013 9:19 AM Brenda Alas  Repair, left ankle fracture  Colonoscopy (SNOMED CT 041350619) in the month of 12/2003 at 58 Years.  Comments:  2/22/2013 9:09 AM Brenda Alas  Finding of diverticulosis/\"rescreening needed in 2007\".    2/22/2013 8:50 AM Brenda Alas  [facility]  Colonoscopy (SNOMED CT 870530839) in 2000 at 55 Years.  Comments:  2/22/2013 8:50 AM Brenda Alas  [facility]  Cholecystectomy (SNOMED CT 83868388) on 10/18/1999 at 54 Years.  Comments:  2/22/2013 9:06 AM Brenda Alas  Laparoscopic.  [facility].  Ablation (SNOMED CT 006018844) on 6/23/1998 at 53 Years.  Comments:  2/22/2013 9:00 AM Brenda Alas  Radioiodine thyroid-type, " for Grave's disease.  D&C - Dilatation and curettage (SNOMED CT 8396275540).   Social History:        Alcohol Assessment            Current, Wine (5 oz), 3-5 times per week, 1 drinks/episode average.  2 drinks/episode maximum.      Tobacco Assessment            Never      Substance Abuse Assessment            Never      Employment and Education Assessment            Self-employed, Work/School description: [employer]  Highest education level: Post graduate               degree(s).      Home and Environment Assessment            Marital status: .  0 children.      Nutrition and Health Assessment            Type of diet: Regular, healthy, almost vegetarian but eats fish.      Exercise and Physical Activity Assessment            Exercise frequency: Daily.  Exercise type: Bicycling, Walking, Weight lifting, Cardiovascular, yard work.      Sexual Assessment            Sexually active: No.        Physical Examination   Vital Signs   3/6/2020 10:33 AM CST Temperature Tympanic 97.2 DegF  LOW    Peripheral Pulse Rate 100 bpm    Systolic Blood Pressure 114 mmHg    Diastolic Blood Pressure 66 mmHg    Mean Arterial Pressure 82 mmHg    BP Site Right arm    BP Method Manual    Oxygen Saturation 96 %      Measurements from flowsheet : Measurements   3/6/2020 10:33 AM CST    Weight Measured - Standard                128.4 lb     General:  Alert and oriented, No acute distress, looks like a 5# wt loss this year.    Eye:  Normal conjunctiva.    HENT:  Normocephalic, Oral mucosa is moist.    Neck:  Supple, Non-tender, No thyromegaly.    Respiratory:  Lungs are clear to auscultation, Respirations are non-labored, Breath sounds are equal.    Cardiovascular:  Normal rate, Regular rhythm, No murmur.    Musculoskeletal:  Normal gait, she was able to walk across the room with no evidence of limp or balance problems.    Integumentary:  Warm, Dry, Pink.    Neurologic:  Alert, Oriented.       Review / Management   Results  review:  Lab results   3/5/2020 2:31 PM CST Sodium Level 144 mmol/L    Potassium Level 3.9 mmol/L    Chloride Level 105 mmol/L    CO2 Level 25 mmol/L    Glucose Level 145 mg/dL  HI    BUN 16 mg/dL    Creatinine Level 0.69 mg/dL    BUN/Creat Ratio NOT APPLICABLE    eGFR 87 mL/min/1.73m2    eGFR African American 101 mL/min/1.73m2    Calcium Level 9.0 mg/dL    Bilirubin Total 0.7 mg/dL    Alkaline Phosphatase 119 unit/L    AST/SGOT 26 unit/L    ALT/SGPT 13 unit/L    Protein Total 6.6 gm/dL    Albumin Level 4.5 gm/dL    Globulin 2.1    A/G Ratio 2.1    LDL Direct 161 mg/dL  HI    TSH 0.57 mIU/L   .       Impression and Plan   Diagnosis     Dyslipidemia (IQY97-EC E78.5).     Hypothyroid (TGD36-UQ E03.9).     Plan:  25 min in counseling, over 20 min in recommendation of balance training if desired, she declines; advised referral to neurology to further assess memory and safety, she declines. Advised colonoscopy, she declines, does have Fh of colon cancer in brother. Meds refilld, counseled on safety and to limit etoh use.    Patient Instructions:       Counseled: Patient, Regarding diagnosis, Regarding treatment, Regarding medications, Verbalized understanding.    Orders     Orders (Selected)   Prescriptions  Prescribed  levothyroxine 100 mcg (0.1 mg) oral tablet: = 1 tab(s) ( 100 mcg ), Oral, daily, # 90 tab(s), 3 Refill(s), Type: Maintenance, Pharmacy: [facility], disp feb 14 for 30, 1 tab(s) Oral daily.

## 2022-02-15 NOTE — TELEPHONE ENCOUNTER
Order, demographics, and note brought to [facility], they will contact patient to schedule.

## 2022-02-15 NOTE — PROGRESS NOTES
Patient:   ANNALISE CUEVAS            MRN: [id number]            FIN: [id number]               Age:   72 years     Sex:  Female     :  1945   Associated Diagnoses:   Fatty liver, alcoholic; Hypothyroid; Malnutrition   Author:   Jacques Segundo MD      Chief Complaint   10/30/2017 12:48 PM CDT  Pt here for post hospital visit for ketoacidosis.        History of Present Illness             The patient presents with had stomach pains after a heavy night of alcohol.  she drinks most nights several drinks.  however this episode prevented her from meaningful intake.  she became weaker.  she was seen and admitted with ketoacidosis and malnutrition.       she is feeling dramatically better.   she notes that she still has low appetitie but  can eat and has been.  she also is drinking water regularly    she feels strong and not at risk to fall.        Review of Systems   Constitutional:  No fever, No chills.    Eye   Ear/Nose/Mouth/Throat:  No nasal congestion, No sore throat.    Respiratory:  No shortness of breath, No cough.    Cardiovascular   Breast   Gastrointestinal:  Nausea, No vomiting, No diarrhea, No constipation, No heartburn.    Genitourinary:  No dysuria.    Gynecologic   Hematology/Lymphatics:  No bruising tendency, No swollen lymph glands.    Endocrine:  No excessive thirst, No polyuria, No cold intolerance, No heat intolerance.    Immunologic:  No recurrent fevers, No recurrent infections.    Musculoskeletal:  No muscle pain.    Integumentary:  No rash.    Neurologic:  No abnormal balance, No confusion, No numbness, No tingling, No headache.    Psychiatric            Health Status   Allergies:    Allergic Reactions (Selected)  No known allergies   Medications:  (Selected)   Prescriptions  Prescribed  betamethasone dipropionate 0.05% topical cream: 1 sha, top, bid, Instructions: to affected area, # 50 gm, 1 Refill(s), Type: Maintenance, Pharmacy: [facility], to replace fluocinonide cream due to cost,  1 sha top bid,x7 day(s),Instr:to affected area  levothyroxine 112 mcg (0.112 mg) oral tablet: 1 tab(s) ( 112 mcg ), po, daily, # 90 tab(s), 3 Refill(s), Type: Maintenance, Pharmacy: [facility], 1 tab(s) po daily  Documented Medications  Documented  aspirin 81 mg oral tablet: 1 tab(s) ( 81 mg ), po, daily, 0 Refill(s), Type: Maintenance  omeprazole 20 mg oral delayed release capsule: 1 cap(s) ( 20 mg ), PO, Daily, # 90 cap(s), 0 Refill(s), Type: Maintenance  ondansetron 4 mg oral tablet, disintegratin tab(s) ( 4 mg ), PO, q8 hrs, # 10 tab(s), 0 Refill(s), Type: Maintenance  potassium chloride 10 mEq oral capsule, extended release: 1 cap(s) ( 10 mEq ), PO, BID, # 60 cap(s), 0 Refill(s), Type: Maintenance   Problem list:    All Problems (Selected)  Hypothyroid / 31521357 / Confirmed  Dyslipidemia / 2883677301 / Confirmed  Mild  Disorder of bone and cartilage / 146321964 / Confirmed  Mild  declines dexa  Prediabetes / 0983377356 / Confirmed  Fatty liver, alcoholic / 71052948 / Confirmed      Histories   Past Medical History:    Active  Hypothyroid (34019620)  Dyslipidemia (8147115983)  Comments:  2013 CST 2:36 PM Brenda Alas  Mild  Disorder of bone and cartilage (100058997)  Comments:  2013 CST 2:38 PM Brenda Alas  Mild    10/2/2015 CDT 2:06 PM CDT - Medardo NORIEGAC Esthela  declines dexa  Prediabetes (0063163291)  Fatty liver, alcoholic (63892210)  Resolved  Inpatient stay (065907370): Onset on 10/27/2017 at 72 years.  Resolved on 10/28/2017 at 72 years.  Comments:  10/31/2017 CDT 6:00 AM CDT - Desirae Andrea  @[email or web address] [place] [facility]– [place], [place] - Ketoacidosis (likely starvation and alcoholic); alcohol associated hepatitis  Diverticulosis (1309389717): Onset in the month of 2003 at 58 years  Resolved.  History of fracture of left ankle (0218532445):  Resolved.  Comments:  2013 CST 9:20 AM Brenda Alas  Left ankle. Repaired 2005.  Carpal tunnel syndrome on left (22537270):   "Resolved.  Comments:  5/21/2014 CDT 11:10 AM CDT - Munir MALIK, Elizabeth  d/t broken wrist  History of radius fracture (0886625032):  Resolved.  Comments:  2/22/2013 CST 9:33 AM CST - Brenda Santoyo  Left distal.   Family History:    Cancer  Grandfather (M)  Diabetes mellitus type 2  Aunt (M)  Aunt (P)  CA - Cancer of colon  Sister  Colon cancer.  Father  Brother  Degeneration macular.  Mother     Procedure history:    HPV - Human papillomavirus test negative (SNOMED CT 2120514430) on 2/25/2013 at 67 Years.  Comments:  3/5/2013 2:38 PM - Jocelyn Sims  Low risk for cervical cancer and may stop pap screening. (Age 67)  Colonoscopy (SNOMED CT 366431076) in the month of 12/2008 at 63 Years.  Comments:  3/5/2013 2:36 PM - Jocelyn Sims  Due to family hx needs repeat in 5 yrs. (Due 2013)    2/22/2013 9:13 AM - Brenda Santoyo  [facility]  Excision of lipoma (SNOMED CT 853019081) on 3/30/2006 at 61 Years.  Comments:  2/22/2013 8:42 AM - Brenda Santoyo  Back, right side.  Dr. Prado, [facility].  Hysteroscopy (SNOMED CT 615446941) on 12/29/2005 at 60 Years.  ORIF - Open reduction and internal fixation of fracture (SNOMED CT 233815901) in the month of 12/2005 at 60 Years.  Comments:  2/22/2013 9:19 AM - Brenda Santoyo  Repair, left ankle fracture  Colonoscopy (SNOMED CT 546263163) in the month of 12/2003 at 58 Years.  Comments:  2/22/2013 9:09 AM - Brenda Santoyo  Finding of diverticulosis/\"rescreening needed in 2007\".    2/22/2013 8:50 AM - Brenda Santoyo  [facility]  Colonoscopy (SNOMED CT 973909554) in 2000 at 55 Years.  Comments:  2/22/2013 8:50 AM - Brenda Santoyo  [facility]  Cholecystectomy (SNOMED CT 07642233) on 10/18/1999 at 54 Years.  Comments:  2/22/2013 9:06 AM - Brenda Santoyo  Laparoscopic.  [facility].  Ablation (SNOMED CT 835793460) on 6/23/1998 at 53 Years.  Comments:  2/22/2013 9:00 AM - Brenda Santoyo  Radioiodine thyroid-type, for Grave's disease.  D&C - Dilatation and curettage " ([facility] 5061203592).   Social History:        Alcohol Assessment            Current, Wine (5 oz), 3-5 times per week, 1 drinks/episode average.  2 drinks/episode maximum.      Tobacco Assessment            Never      Substance Abuse Assessment            Never      Employment and Education Assessment            Self-employed, Work/School description: /.  Highest education level: Post graduate               degree(s).      Home and Environment Assessment            Marital status: .  0 children.      Nutrition and Health Assessment            Type of diet: Regular, healthy, almost vegetarian but eats fish.      Exercise and Physical Activity Assessment            Exercise frequency: Daily.  Exercise type: Bicycling, Walking, Weight lifting, Cardiovascular, yard work.      Sexual Assessment            Sexually active: No.        Physical Examination   Vital Signs   10/30/2017 12:48 PM CDT Temperature Tympanic 97 DegF  LOW    Peripheral Pulse Rate 64 bpm    Pulse Site Radial artery    Respiratory Rate 16 br/min    Systolic Blood Pressure 110 mmHg    Diastolic Blood Pressure 80 mmHg    Mean Arterial Pressure 90 mmHg    BP Site Right arm    Oxygen Saturation 97 %      Measurements from flowsheet : Measurements   10/30/2017 12:48 PM CDT Height Measured - Standard 65 in    Weight Measured - Standard 136 lb    BSA 1.68 m2    Body Mass Index 22.63 kg/m2      General:  Alert and oriented, No acute distress.    Eye:  Pupils are equal, round and reactive to light, Normal conjunctiva.    HENT:  Oral mucosa is moist.    Neck:  Supple.    Respiratory:  Lungs are clear to auscultation, Respirations are non-labored.    Cardiovascular:  Normal rate, Regular rhythm, Normal peripheral perfusion, No edema.    Gastrointestinal:  Non-distended.    Musculoskeletal:  Normal gait.    Integumentary:  Warm, No rash.    Psychiatric:  Cooperative, Appropriate mood & affect, Normal judgment.       Review / Management    Results review:  Lab results   10/27/2017 2:01 PM CDT Hgb A1c 5.1   10/27/2017 12:56 PM CDT Culture Urine See comment   10/27/2017 12:45 PM CDT UA Epithelial Cells Few    UA Mucous Present    UA Hyaline Cast 26-50    UA WBC 3-5    UA RBC 6-10    UA Bacteria Few   10/27/2017 12:41 PM CDT Sodium Level 134 mmol/L    Potassium Level 3.8 mmol/L    Chloride Level 94 mmol/L    CO2 Level 13 mmol/L    AGAP 27    Glucose Level 112 mg/dL    BUN 9 mg/dL    Creatinine 1.04 mg/dL    BUN/Creat Ratio 9    eGFR  >60    eGFR Non-African American 52    Calcium Level 10.2 mg/dL    Bili Total 2.2 mg/dL    Bili Direct 0.8 mg/dL    Bili Indirect 1.4 mg/dL    Alk Phos 127 IU/L    AST/SGOT 96 IU/L    ALT/SGPT 116 IU/L    Protein Total 7.5 g/dL    Albumin Level 4.8 g/dL    Globulin 2.7 g/dL    A/G Ratio 1.8    Amylase Level 37 IU/L    WBC 5.8    RBC 4.46    Hgb 14.6 g/dL    Hct 41.4 %    MCV 93 fL    MCH 32.7 pg    MCHC 35.3 g/dL    RDW 15.1 %    Platelet 326    MPV 8.9 fL    Lymphs 28.9 %    Abs Lymphs 1.7    Neutrophils 62.9 %    Abs Neutrophils 3.7    Monocytes 7.1 %    Abs Monocytes 0.4    Eosinophils 0.2 %    Abs Eosinophils 0.0    Basophils 0.9 %    Abs Basophils 0.1   10/27/2017 12:32 PM CDT UA pH 5.5    UA Specific Gravity > OR = 1.030    UA Glucose NEGATIVE    UA Bilirubin 1+    UA Ketones 3+    U Occult Blood NEGATIVE    UA Protein TRACE    UA Nitrite NEGATIVE    UA Leuk Est TRACE   10/27/2017 12:31 PM CDT Folate 5.4 ng/mL  LOW    Vitamin B12 Level 518 pg/mL    T4 Free 2.6 ng/dL  HI    TSH 0.04 mIU/L  LOW   .       Impression and Plan   Diagnosis     Fatty liver, alcoholic (AVA51-RH K70.0).     Hypothyroid (ORJ99-HH E03.9).     Malnutrition (LVU88-TI E46).     Course:  patient not drinking but does not see it as a problem long term  resumed nourisment  will check labs today and recheck in 2 weeks.

## 2022-02-15 NOTE — NURSING NOTE
Phone Message    PCP:   ZOFIA?      Time of Call:  9:58       Person Calling:  Pt  Phone number:  [phone]    Time returned call:  10:13    Note:  Patient left message requesting appointment for TSH.  Per chart review she has not been seen >one year.  Message left on identified voice mail advising patient to schedule a lab appointment at least two days before HCP appointment for TSH.      RTC order placed for TSHPt called again wondering about appointment to renew her THS medication.    Informed her that she is due for OV and labs. She agreed and will call back to make an appointment.

## 2022-02-15 NOTE — TELEPHONE ENCOUNTER
---------------------  From: Libby Stanley   To: Medardo WATKINS, Esthela;     Sent: 2/24/2020 6:08:28 PM CST  Subject: Scheduling Management     pt no showed for f/u appt on 02.24.20   good, to achieve stated therapy goals

## 2022-02-15 NOTE — NURSING NOTE
Comprehensive Intake Entered On:  3/6/2020 10:37 AM CST    Performed On:  3/6/2020 10:33 AM CST by Annelise Patel LPN               Summary   Chief Complaint :   medication check/lab work   Advance Directive :   No   Menstrual Status :   Postmenopausal   Weight Measured :   128.4 lb(Converted to: 128 lb 6 oz, 58.24 kg)    Systolic Blood Pressure :   114 mmHg   Diastolic Blood Pressure :   66 mmHg   Mean Arterial Pressure :   82 mmHg   Peripheral Pulse Rate :   100 bpm   BP Site :   Right arm   BP Method :   Manual   Temperature Tympanic :   97.2 DegF(Converted to: 36.2 DegC)  (LOW)    Oxygen Saturation :   96 %   Race :      Languages :   English   Ethnicity :   Not  or    Annelise Patel LPN - 3/6/2020 10:33 AM CST   Health Status   Allergies Verified? :   Yes   Medication History Verified? :   Yes   Immunizations Current :   Yes   Medical History Verified? :   No   Pre-Visit Planning Status :   Not completed   Tobacco Use? :   Never smoker   Annelise Patel LPN - 3/6/2020 10:33 AM CST   Meds / Allergies   (As Of: 3/6/2020 10:37:54 AM CST)   Allergies (Active)   No Known Medication Allergies  Estimated Onset Date:   Unspecified ; Created By:   Annelise Patel LPN; Reaction Status:   Active ; Category:   Drug ; Substance:   No Known Medication Allergies ; Type:   Allergy ; Updated By:   Annelise Patel LPN; Reviewed Date:   3/6/2020 10:34 AM CST        Medication List   (As Of: 3/6/2020 10:37:54 AM CST)   Prescription/Discharge Order    levothyroxine  :   levothyroxine ; Status:   Prescribed ; Ordered As Mnemonic:   levothyroxine 100 mcg (0.1 mg) oral tablet ; Simple Display Line:   100 mcg, 1 tab(s), Oral, daily, this is a dose change. See REYNOLD Begum in 10 weeks and repeat labs that day, 30 tab(s), 0 Refill(s) ; Ordering Provider:   Esthela Camara; Catalog Code:   levothyroxine ; Order Dt/Tm:   2/26/2020 10:18:21 AM CST            Home Meds    aspirin  :   aspirin ;  Status:   Documented ; Ordered As Mnemonic:   aspirin 81 mg oral tablet ; Simple Display Line:   81 mg, 1 tab(s), po, daily, 0 Refill(s) ; Catalog Code:   aspirin ; Order Dt/Tm:   10/2/2015 2:35:48 PM CDT

## 2022-02-15 NOTE — TELEPHONE ENCOUNTER
---------------------  From: Kadi Doran (Phone Messages Pool ([id number]_[facility]))   To: [facility] Message Pool ([id number]_[facility]);     Sent: 3/9/2020 10:19:43 AM CDT  Subject: Multiple     Phone Message    PCP: [facility]    Time of Call: 0932    Phone Number: [phone]    Returned call at: 1010    Note: Hailey Fraire (Friend) called stating that she has a few questions for [facility] in regards to a colonoscopy order and a Neurology referral.    Called Hailey at [phone]. Notified her that I cannot release information to her because there is no MILA on file for her. Hailey stated that she will come into clinic to pick it up to get it signed. Hailey states that she still wants this to be sent to [facility] because pt needs to have a colonoscopy done as there are numerous cases of colon cancer in the family.     Per last visit note, patient declining what Hailey is suggesting above.     Plan:  25 min in counseling, over 20 min in recommendation of balance training if desired, she declines; advised referral to neurology to further assess memory and safety, she declines. Advised colonoscopy, she declines, does have Fh of colon cancer in brother. Meds refilld counseled on safety and to limit etoh use.        Last office visit and reason: 3/6/19 Thyroid     Transferred to: [facility] Message Pool---------------------  From: Yumiko Martinez MA ([facility] Message Pool ([id number]_[facility]))   To: Esthela Camara;     Sent: 3/9/2020 11:05:56 AM CDT  Subject: FW: Multiple---------------------  From: Esthela Camara   To: Phone Messages Pool ([id number]_[place] [facility]);     Sent: 3/9/2020 11:17:02 AM CDT  Subject: RE: Multiple     I called and spoke with Eris . Her family has gone to [place] and all live in the [place] area and that is where she wants to have that done. I encouraged her to pursue this, she will let me know if she needs anything from me

## 2022-02-15 NOTE — PROGRESS NOTES
Patient:   ANNALISE CUEVAS            MRN: [id number]            FIN: [id number]               Age:   72 years     Sex:  Female     :  1945   Associated Diagnoses:   Alcoholic; Weight loss   Author:   Alicia Ring      Chief Complaint   10/27/2017 11:35 AM CDT  Pt c/o nausea, loss of appetite x 10 days. Had been dieting prior to but recently has lost appetite and dramatic wt loss. Has been vomiting. No diarrhea, no pain.      History of Present Illness   I have not seen the patient since 2016, and only saw her once then.  The time I saw her in  was for contact dermatitis.    She comes into the clinic today with complaints of nausea and loss of appetite for about two weeks.  She had been dieting over the past year, although she states that she not lost significant weight until the past two to three months.  This current weight loss has been unintentional.  She is down 40 pounds from last year.  Over the past two weeks, she has had vomiting and nausea with no appetite.    She has had no dyspnea, no chest pain, no headache, and no rash.  No recent travel.  She has had no diarrhea, because she states that she has not had a bowel movement for several days because she is not eating anything.  No dysuria.  She has had no swelling of the lower extremities.  She does admit to me that she drinks alcohol on a regular basis   three to four glasses of wine at night and occasionally mixed drinks if she is socializing.  She also tells me that two weeks ago she went to a op5 party where she was binge drinking and ever since then, her symptoms have dramatically worsened.    When I walk into the room today, she is holding her stomach.  She appears slightly sallow.  I don t see jaundice necessarily. She is mildly anxious today.  She has a history of hypothyroidism.              Review of Systems   Constitutional:  Weakness, Fatigue, Decreased activity.    Ear/Nose/Mouth/Throat:  Negative.    Respiratory:   Negative.    Cardiovascular:  Negative.    Gastrointestinal:  Negative except as documented in history of present illness.    Genitourinary:  Negative.    Hematology/Lymphatics:  Negative.    Endocrine:  Negative except as documented in history of present illness.    Immunologic:  Negative.    Musculoskeletal:  Negative.    Integumentary:  Negative.    Neurologic:  Negative.    Psychiatric:  Negative except as documented in history of present illness.       Health Status   Allergies:    Allergic Reactions (Selected)  No known allergies   Medications:  (Selected)   Prescriptions  Prescribed  betamethasone dipropionate 0.05% topical cream: 1 sha, top, bid, Instructions: to affected area, # 50 gm, 1 Refill(s), Type: Maintenance, Pharmacy: [facility], to replace fluocinonide cream due to cost, 1 sha top bid,x7 day(s),Instr:to affected area  levothyroxine 112 mcg (0.112 mg) oral tablet: 1 tab(s) ( 112 mcg ), po, daily, # 90 tab(s), 3 Refill(s), Type: Maintenance, Pharmacy: [facility], 1 tab(s) po daily  Documented Medications  Documented  aspirin 81 mg oral tablet: 1 tab(s) ( 81 mg ), po, daily, 0 Refill(s), Type: Maintenance   Problem list:    All Problems (Selected)  Disorder of Bone and Cartilage / ICD-9-.90 / Confirmed  Dyslipidemia / ICD-9-.4 / Confirmed  Hypothyroid / SNOMED CT 24332687 / Confirmed  Prediabetes / ICD-9-.29 / Confirmed      Histories   Past Medical History:    Active  Hypothyroid (11696079)  Dyslipidemia (272.4)  Comments:  2/21/2013 CST 2:36 PM Brenda Alas  Mild  Disorder of Bone and Cartilage (733.90)  Comments:  2/21/2013 CST 2:38 PM Brenda Alas  Mild    10/2/2015 CDT 2:06 PM CDT - Esthela Camara  declines dexa  Prediabetes (790.29)  Resolved  Diverticulosis (562.10): Onset in the month of 12/2003 at 58 years  Resolved.  Ankle Fracture (824.8):  Resolved.  Comments:  2/22/2013 CST 9:20 AM Brenda Alas  Left ankle. Repaired 12/2005.  Carpal  tunnel syndrome on left (354.0):  Resolved.  Comments:  5/21/2014 CDT 11:10 AM CDT - Munir MALIK, Elizabeth  d/t broken wrist  Radial fracture (813.81):  Resolved.  Comments:  2/22/2013 CST 9:33 AM CST - Brenda Santoyo  Left distal.      Physical Examination   Vital Signs   10/27/2017 11:35 AM CDT Temperature Tympanic 96.3 DegF  LOW    Peripheral Pulse Rate 88 bpm    Pulse Site Radial artery    HR Method Manual    Systolic Blood Pressure 118 mmHg    Diastolic Blood Pressure 84 mmHg    Mean Arterial Pressure 95 mmHg    BP Site Right arm    BP Method Manual      Measurements from flowsheet : Measurements   10/27/2017 11:35 AM CDT  Weight Measured - Standard                127 lb     General:  Alert and oriented, No acute distress, able to move around without difficulty, able to climb on and off table without difficulty.    Eye:  Pupils are equal, round and reactive to light.    HENT:  Normocephalic.    Neck:  Supple, Non-tender.    Respiratory:  Lungs are clear to auscultation.    Cardiovascular:  Normal rate.    Gastrointestinal:  Soft, Non-tender, Non-distended, Normal bowel sounds, No organomegaly.    Genitourinary:  No costovertebral angle tenderness.    Lymphatics:  No lymphadenopathy neck, axilla, groin.    Musculoskeletal:  Normal range of motion.    Integumentary:  Warm, Dry, Pink.    Neurologic:  Alert, Oriented, Normal sensory.    Psychiatric:  Cooperative, Appropriate mood & affect, Normal judgment.        4mg zofran ODT with mild improvement in symptoms      Review / Management   Results review:  Lab results   10/27/2017 12:45 PM CDT UA Epithelial Cells Few    UA Mucous Present    UA Hyaline Cast 26-50    UA WBC 3-5    UA RBC 6-10    UA Bacteria Few   10/27/2017 12:41 PM CDT Sodium Level 134 mmol/L    Potassium Level 3.8 mmol/L    Chloride Level 94 mmol/L    CO2 Level 13 mmol/L    AGAP 27    Glucose Level 112 mg/dL    BUN 9 mg/dL    Creatinine Level 1.04 mg/dL    BUN/Creat Ratio 9    eGFR  >60     eGFR Non-African American 52    Calcium Level 10.2 mg/dL    Bilirubin Total 2.2 mg/dL    Bilirubin Direct 0.8 mg/dL    Bilirubin Indirect 1.4 mg/dL    Alkaline Phosphatase 127 IU/L    AST/SGOT 96 IU/L    ALT/SGPT 116 IU/L    Protein Total 7.5 g/dL    Albumin Level 4.8 g/dL    Globulin 2.7 g/dL    A/G Ratio 1.8    Amylase Level 37 IU/L    WBC 5.8    RBC 4.46    Hgb 14.6 g/dL    Hct 41.4 %    MCV 93 fL    MCH 32.7 pg    MCHC 35.3 g/dL    RDW 15.1 %    Platelet 326    MPV 8.9 fL    Lymphocytes 28.9 %    Abs Lymphocytes 1.7    Neutrophils 62.9 %    Abs Neutrophils 3.7    Monocytes 7.1 %    Abs Monocytes 0.4    Eosinophils 0.2 %    Abs Eosinophils 0.0    Basophils 0.9 %    Abs Basophils 0.1   10/27/2017 12:32 PM CDT UA pH 5.5    UA Specific Gravity > OR = 1.030    UA Glucose NEGATIVE    UA Bilirubin 1+    UA Ketones 3+    Urine Occult Blood NEGATIVE    UA Protein TRACE    UA Nitrite NEGATIVE    UA Leukocyte Esterase TRACE     .    Radiology results   Computed tomography, diverticulosis   hepatic steatosis  no masses  cholecystectomy      Impression and Plan   Diagnosis     Alcoholic (RWW06-PT F10.20).     Weight loss (NAU29-AS R63.4).     Plan:  My concern with the patient today is absolutely I could hydrate her here in the clinic with some IV fluids run slowly to avoid any kind of pulmonary edema; however, it is Friday afternoon and she does live alone.  My concern is that if she is not tolerating this I am sending a 72-year-old woman who is telling me that she is losing significant weight home to a house where she is alone and then there is the risk of falls.  My other concern is if she is starting to go through alcohol withdrawal, does she need to be in a supervised setting so I don't have to worry about her safety.    Based on the labs, I did confer with both Jody Westbrook MD and Demond Little MD, who agree at this point in time that the patient would benefit from hydration.  I was able to contact  the hospitalist Leonides Moreira MD who has agreed to admit the patient over at the [facility] for hydration and monitoring.  Although the patient is not happy about having to be hospitalized, she understands the importance of this.  We have reviewed her labs and her CT scan together.

## 2022-02-15 NOTE — TELEPHONE ENCOUNTER
Entered by Ana Hillman CMA on February 24, 2020 8:49:42 AM CST  ---------------------  From: Ana Hillman CMA   To: [facility]    Sent: 2/24/2020 8:49:38 AM CST  Subject: Medication Management     ** Not Approved: Refill not appropriate, will address refill at appt this afternoon **  levothyroxine (LEVOTHYROXINE 100MCG TABLET 0.1MG)  TAKE 1 TABLET BY MOUTH ONCE DAILY  Qty:  30 unknown unit        Days Supply:  0        Refills:  0          Substitutions Allowed     Route To Pharmacy - [facility]   Signed by Ana Hillman CMA            ** Patient matched by Ana Hillman CMA on 2/24/2020 8:44:03 AM CST **      ------------------------------------------  From: [facility]  To: Esthela Camara  Sent: February 21, 2020 3:00:44 PM CST  Subject: Medication Management  Due: February 22, 2020 3:00:44 PM CST    ** On Hold Pending Signature **  Drug: levothyroxine (levothyroxine 100 mcg (0.1 mg) oral tablet)  TAKE 1 TABLET BY MOUTH ONCE DAILY  Quantity: 30 unknown unit  Days Supply: 0  Refills: 0  Substitutions Allowed  Notes from Pharmacy:     Dispensed Drug: levothyroxine (levothyroxine 100 mcg (0.1 mg) oral tablet)  TAKE 1 TABLET BY MOUTH ONCE DAILY  Quantity: 30 unknown unit  Days Supply: 0  Refills: 0  Substitutions Allowed  Notes from Pharmacy:   ------------------------------------------

## 2022-02-15 NOTE — TELEPHONE ENCOUNTER
---------------------  From: Jerod MALIK, [place] CHEEMA   To: [facility] Message Pool ([id number]_[facility] [place]);     Sent: 3/4/2020 10:05:47 AM CST  Subject: Labs/FYI     Patient did not show 2/24/20 for OV with [facility] and now has lab appointment for 3/5/20.    I have ordered CMP, Direct LDL, Hepatic Panel, TSH based on diagnosis and previous labs.    I called patient and informed her she NEEDS to see [facility] in office in order to have meds filled. I transferred her call to scheduling but she still only have lab appointment.    Please advise if you would like different labs done (unable to do fasting lipid panel as pt cannot come in until 1430).---------------------  From: Annelise Patel LPN ([facility] Message Pool ([id number]_[facility] [place]))   To: Esthela Camara;     Sent: 3/4/2020 10:46:56 AM CST  Subject: FW: Labs/FYI---------------------  From: Esthela Camara   To: [facility] Message Pool ([id number]_[facility] [place]);     Sent: 3/4/2020 12:18:45 PM CST  Subject: RE: Labs/FYI     thanks. I removed the hepatic panel as a CMP will include enough of a hepatic panel.Orders printed

## 2022-02-15 NOTE — PROGRESS NOTES
Chief Complaint    Pt c/o fall in December and hit head - seen in ER in [place] - required stitches. Pt also had fall today while walking - unsure if tripped or slipped. Hit head and elbows. Unsure of LOC. Pt feels dizzy, unsteady, confused. [place] by [place].  History of Present Illness      Patient presents to clinic today with a friend.  Earlier today patient was found by a stranger after the patient had a fall.  Patient reports that she is not sure why she fell.  She does not recall tripping on anything feeling lightheaded.  He does have a history of alcohol abuse and her friend reports that the patient had told her that she had been consuming alcohol earlier in the day.  Patient now says that she does not remember she was drinking drinking earlier today or not.  In patients that is completed today but review of the information on her scat indicates that patient's mood is not really altered from baseline.  Patient thinks that she may be more confused than usual however her friend reports that over the past year patient has had increased amounts of confusion and cognitive decline.  Her friend mentioned that she thinks that this is secondary medication alcohol abuse.  Patient does report that she had been referred to the program at [facility].  She actually was found to be pulling through the air on felt like he was almost like a vacation.  Unfortunately, no sobriety has not lasted.  At this time she is not interested in pursuing D, and treatment.  She does have a bit of a headache.  She also has some bruising to her face following the fall.  Patient reports she would be willing to be seen in the emergency department to make sure that she did not have an intracranial hemorrhage and going on.  Patient's friend reports that the lady not found the patient today after the fall stayed with the patient and tell her friend got there because she was worried about the patient's confusion.   Physical Exam    Vitals & Measurements    T: 96.8   F (Tympanic)  HR: 80(Peripheral)  BP: 110/74     WT: 134 lb       Review of systems is negative except as per HPI      Exam:      General: alert and oriented ×3 no acute distress.      HEENT: Normocephalic, patient does have some ecchymosis near her left eye..       Eyes pupils are equal round and reactive to light extraocular motion is intact.       Hearing is grossly normal and there is no otorrhea. Tympanic membranes are pearly grey with a normal light reflex.      Nares are patent there is no rhinorrhea.       Mucous membranes are moist and pink.      Chest: has bilateral rise with no increased work of breathing. clear to auscultation without wheezes, rhonchi, or rales.      Cardiovascular: normal perfusion and brisk capillary refill. S1S2 with regular rate and rhythm and no murmurs, gallops or rubs.      Musculoskeletal: no gross focal abnormalities.      Neuro: no gross focal abnormalities and memory seems intact.      Psychiatric: speech is clear and coherent and fluent. Patient dressed appropriately for the weather.  Memory and insight both seem impaired.          Assessment/Plan       Alcohol use (Z78.9)                    15 minutes spent with patient in direct face to face contact and discussion and counseling regarding her alcohol use.  We discussed various treatment options including both inpatient and outpatient settings in the need to have supervision during detox due to risk of life-threatening seizures from abrupt alcohol cessation.  She does agree that when she becomes a nondrinker she will have to quit completely that she does not be successful at being a nondrinker by just trying to decrease alcohol she drinks.  She is willing to return to clinic in 2 weeks to discuss and revisit treatment options.         Orders:          88982 office outpatient visit 15 minutes (Charge), Quantity: 1, Alcohol use  Fall          Return to Clinic (Request), RFV: follow up er and  review alcohol treatment options, Return in 2 weeks                Fall (W19.XXXA)        Unsure what patient's current alcohol level is or how alcohol may have affected this.  It and concerned about her memory.  I do not know her baseline.  I did speak with the [facility] emergency room physician regarding my concerns and he accepted care of the patient.  Patient was taken to the emergency room by our clinical support staff via wheelchair.         Orders:          05823 office outpatient visit 15 minutes (Charge), Quantity: 1, Alcohol use  Fall          Return to Clinic (Request), RFV: follow up er and review alcohol treatment options, Return in 2 weeks           Patient Information     Name:ANNALISE CUEVAS      Address:      [address] [place] [id number]     Sex:Female     YOB: 1945     [phone]     Emergency Contact:LATONIA CUEVAS     MRN:[id number]     FIN:[id number]     Location:[facility]     Date of Service:05/30/2018      Primary Care Physician:       Esthela Camara, [phone]      Attending Physician:       Jody Westbrook MD, [phone]  Problem List/Past Medical History    Ongoing     Disorder of bone and cartilage       Comments: declines dexa Mild     Dyslipidemia       Comments: Mild     Fatty liver, alcoholic     Hypothyroid     Prediabetes    Historical     Carpal tunnel syndrome on left       Comments: d/t broken wrist     Diverticulosis     History of fracture of left ankle       Comments: Left ankle. Repaired 12/2005.     History of radius fracture       Comments: Left distal.     Inpatient stay       Comments: @[place], [place] - Ketoacidosis (likely starvation and alcoholic); alcohol associated hepatitis  Procedure/Surgical History     HPV - Human papillomavirus test negative (02/25/2013)             Comments: Low risk for cervical cancer and may stop pap screening. (Age 67)     Colonoscopy  (12/2008)             Comments: [facility] Due to family hx needs repeat in 5 yrs. (Due 2013)     Excision of lipoma (03/30/2006)             Comments: Back, right side. &nbsp;Dr. Prado, [facility].     Hysteroscopy (12/29/2005)           ORIF - Open reduction and internal fixation of fracture (12/2005)             Comments: Repair, left ankle fracture     Colonoscopy (12/2003)             Comments: Finding of diverticulosis/&quot;rescreening needed in 2007&quot;. [facility]     Colonoscopy (2000)             Comments: [facility]     Cholecystectomy (10/18/1999)             Comments: Laparoscopic. &nbsp;[facility].     Ablation (06/23/1998)             Comments: Radioiodine thyroid-type, for Grave's disease.     D&C - Dilatation and curettage        Medications     aspirin 81 mg oral tablet: 81 mg, 1 tab(s), po, daily, 0 Refill(s).     betamethasone dipropionate 0.05% topical cream: 1 sha, top, bid, for 7 day(s), to affected area, 50 gm, 1 Refill(s).     levothyroxine 112 mcg (0.112 mg) oral tablet: 112 mcg, 1 tab(s), po, daily, 30 tab(s), 0 Refill(s).          Allergies    No known allergies  Social History    Smoking Status - 11/06/2017     Never smoker     Alcohol      Current, Wine (5 oz), 3-5 times per week, 1 drinks/episode average. 2 drinks/episode maximum., 05/21/2014     Employment and Education      Self-employed, Work/School description: /. Highest education level: Post graduate degree(s)., 05/21/2014     Exercise and Physical Activity      Exercise frequency: Daily. Exercise type: Bicycling, Walking, Weight lifting, Cardiovascular, yard work., 10/02/2015     Home and Environment      Marital status: . 0 children., 10/02/2015     Nutrition and Health      Type of diet: Regular, healthy, almost vegetarian but eats fish., 05/21/2014     Sexual      Sexually active: No., 05/21/2014     Substance Abuse      Never, 05/21/2014     Tobacco      Never, 05/21/2014  Family History     CA - Cancer of colon: Sister.    Cancer: Grandfather (M).    Colon cancer.: Father and Brother.    Degeneration macular.: Mother.    Diabetes mellitus type 2: Aunt (M) and Aunt (P).    Brother: History is negative    Sister: History is negative    Brother: History is negative    Sister: History is negative  Immunizations      Vaccine Date Status Comments      influenza virus vaccine, inactivated 12/20/2016 Recorded [1/30/2018] Received at [place], [place]      influenza virus vaccine, inactivated 10/06/2015 Given      pneumococcal (PCV13) 10/02/2015 Given      influenza virus vaccine, inactivated 10/23/2014 Given      influenza virus vaccine, inactivated 11/27/2013 Given      pneumococcal (PPSV23) 02/27/2013 Given      ZOS, shingles 02/27/2013 Recorded      Td 11/17/2005 Recorded      Hep A 09/10/2001 Recorded      typhoid, inactivated 01/24/2001 Recorded      Hep A 01/24/2001 Recorded      Td 07/01/1993 Recorded  Lab Results       Lab Results (Last 4 results within 90 days)        Bilirubin Total: 1.4 mg/dL High [0.2 mg/dL - 1.2 mg/dL] (04/07/18 10:25:00 CDT)       Bilirubin Direct: 0.2 mg/dL [33 unit/L - 130 unit/L] (04/07/18 10:25:00 CDT)       Bilirubin Indirect: 1.2 [0.2  - 1.2] (04/07/18 10:25:00 CDT)       Alkaline Phosphatase: 116 unit/L [33 unit/L - 130 unit/L] (04/07/18 10:25:00 CDT)       AST/SGOT: 19 unit/L [10 unit/L - 35 unit/L] (04/07/18 10:25:00 CDT)       ALT/SGPT: 15 unit/L [6 unit/L - 29 unit/L] (04/07/18 10:25:00 CDT)       Protein Total: 6.7 gm/dL [6.1 gm/dL - 8.1 gm/dL] (04/07/18 10:25:00 CDT)       Albumin Level: 4.4 gm/dL [3.6 gm/dL - 5.1 gm/dL] (04/07/18 10:25:00 CDT)       Globulin: 2.3 [1.9  - 3.7] (04/07/18 10:25:00 CDT)       A/G Ratio: 1.9 [1  - 2.5] (04/07/18 10:25:00 CDT)       T4 Free: 1.8 ng/dL [0.8 ng/dL - 1.8 ng/dL] (04/07/18 10:25:00 CDT)       TSH: 0.65 mIU/L [0.4 mIU/L - 4.5 mIU/L] (04/07/18 10:25:00 CDT)       Iron Level: 129 mcg/dL [45 mcg/dL - 160 mcg/dL]  (04/07/18 10:25:00 CDT)       TIBC: 415 [250  - 450] (04/07/18 10:25:00 CDT)       Iron Saturation: 31 [11  - 50] (04/07/18 10:25:00 CDT)

## 2023-01-05 NOTE — TELEPHONE ENCOUNTER
---------------------  From: Francine Al   To: Medardo WATKINS, Esthela;     Sent: 11/4/2019 6:06:21 PM CST  Subject: Scheduling Management     Patient no showed appointment. Appointment was for an Rx refill.   Detail Level: Detailed Detail Level: Generalized